# Patient Record
Sex: FEMALE | Race: OTHER | Employment: PART TIME | URBAN - METROPOLITAN AREA
[De-identification: names, ages, dates, MRNs, and addresses within clinical notes are randomized per-mention and may not be internally consistent; named-entity substitution may affect disease eponyms.]

---

## 2018-05-03 ENCOUNTER — HOSPITAL ENCOUNTER (EMERGENCY)
Facility: HOSPITAL | Age: 33
Discharge: HOME | End: 2018-05-03
Attending: EMERGENCY MEDICINE
Payer: COMMERCIAL

## 2018-05-03 ENCOUNTER — APPOINTMENT (OUTPATIENT)
Dept: GENERAL RADIOLOGY | Facility: HOSPITAL | Age: 33
End: 2018-05-03
Attending: EMERGENCY MEDICINE
Payer: COMMERCIAL

## 2018-05-03 VITALS
SYSTOLIC BLOOD PRESSURE: 118 MMHG | RESPIRATION RATE: 18 BRPM | OXYGEN SATURATION: 98 % | TEMPERATURE: 98 F | DIASTOLIC BLOOD PRESSURE: 70 MMHG | HEART RATE: 70 BPM | HEIGHT: 63 IN | BODY MASS INDEX: 31.89 KG/M2 | WEIGHT: 180 LBS

## 2018-05-03 DIAGNOSIS — M77.11 LATERAL EPICONDYLITIS, RIGHT ELBOW: Primary | ICD-10-CM

## 2018-05-03 PROCEDURE — 99283 EMERGENCY DEPT VISIT LOW MDM: CPT

## 2018-05-03 PROCEDURE — 73090 X-RAY EXAM OF FOREARM: CPT | Performed by: EMERGENCY MEDICINE

## 2018-05-03 RX ORDER — CYCLOBENZAPRINE HCL 5 MG
5 TABLET ORAL 2 TIMES DAILY PRN
Qty: 20 TABLET | Refills: 0 | Status: SHIPPED | OUTPATIENT
Start: 2018-05-03

## 2018-05-03 RX ORDER — NAPROXEN 500 MG/1
500 TABLET ORAL 2 TIMES DAILY PRN
Qty: 20 TABLET | Refills: 0 | Status: SHIPPED | OUTPATIENT
Start: 2018-05-03 | End: 2018-05-10

## 2018-05-03 RX ORDER — NAPROXEN 500 MG/1
500 TABLET ORAL ONCE
Status: COMPLETED | OUTPATIENT
Start: 2018-05-03 | End: 2018-05-03

## 2018-05-03 NOTE — ED PROVIDER NOTES
Patient Seen in: [facility] AND [facility] Emergency Department    History   Patient presents with:  Numbness Weakness (neurologic)    Stated Complaint: bilateral had numbness    HPI    Patient presents with right forearm pain and she says sometimes it feels num today and agreed except as otherwise stated in HPI.     Physical Exam   ED Triage Vitals [05/03/18 1543]  BP: 113/61  Pulse: 72  Resp: 20  Temp: 98.2 °F (36.8 °C)  Temp src: Oral  SpO2: 99 %  O2 Device: None (Room air)    Current:/70   Pulse 70   Temp pathophysiology length of time to improve NSAID therapy I offered her injection of Toradol she prefers p.o. we will give dose of naproxen as well as x-ray    X-ray was reassuring.   I discussed with her that she will need to follow-up with orthopedic physic

## 2019-04-21 ENCOUNTER — HOSPITAL ENCOUNTER (EMERGENCY)
Facility: HOSPITAL | Age: 34
Discharge: HOME | End: 2019-04-21
Attending: EMERGENCY MEDICINE
Payer: MEDICAID

## 2019-04-21 VITALS
HEART RATE: 86 BPM | RESPIRATION RATE: 14 BRPM | TEMPERATURE: 98 F | DIASTOLIC BLOOD PRESSURE: 66 MMHG | SYSTOLIC BLOOD PRESSURE: 128 MMHG | OXYGEN SATURATION: 100 %

## 2019-04-21 DIAGNOSIS — N76.4 LABIAL ABSCESS: Primary | ICD-10-CM

## 2019-04-21 PROCEDURE — 81001 URINALYSIS AUTO W/SCOPE: CPT | Performed by: EMERGENCY MEDICINE

## 2019-04-21 PROCEDURE — 87808 TRICHOMONAS ASSAY W/OPTIC: CPT | Performed by: EMERGENCY MEDICINE

## 2019-04-21 PROCEDURE — 87205 SMEAR GRAM STAIN: CPT | Performed by: EMERGENCY MEDICINE

## 2019-04-21 PROCEDURE — 96372 THER/PROPH/DIAG INJ SC/IM: CPT

## 2019-04-21 PROCEDURE — 87591 N.GONORRHOEAE DNA AMP PROB: CPT | Performed by: EMERGENCY MEDICINE

## 2019-04-21 PROCEDURE — 56405 I&D VULVA/PERINEAL ABSCESS: CPT

## 2019-04-21 PROCEDURE — 99284 EMERGENCY DEPT VISIT MOD MDM: CPT

## 2019-04-21 PROCEDURE — 87070 CULTURE OTHR SPECIMN AEROBIC: CPT | Performed by: EMERGENCY MEDICINE

## 2019-04-21 PROCEDURE — 87106 FUNGI IDENTIFICATION YEAST: CPT | Performed by: EMERGENCY MEDICINE

## 2019-04-21 PROCEDURE — 87491 CHLMYD TRACH DNA AMP PROBE: CPT | Performed by: EMERGENCY MEDICINE

## 2019-04-21 PROCEDURE — 81025 URINE PREGNANCY TEST: CPT

## 2019-04-21 RX ORDER — LIDOCAINE HYDROCHLORIDE AND EPINEPHRINE 20; 5 MG/ML; UG/ML
20 INJECTION, SOLUTION EPIDURAL; INFILTRATION; INTRACAUDAL; PERINEURAL ONCE
Status: COMPLETED | OUTPATIENT
Start: 2019-04-21 | End: 2019-04-21

## 2019-04-21 RX ORDER — KETOROLAC TROMETHAMINE 30 MG/ML
60 INJECTION, SOLUTION INTRAMUSCULAR; INTRAVENOUS ONCE
Status: COMPLETED | OUTPATIENT
Start: 2019-04-21 | End: 2019-04-21

## 2019-04-21 RX ORDER — DOXYCYCLINE HYCLATE 100 MG/1
100 CAPSULE ORAL 2 TIMES DAILY
Qty: 14 CAPSULE | Refills: 0 | Status: SHIPPED | OUTPATIENT
Start: 2019-04-21 | End: 2019-04-28

## 2019-04-21 RX ORDER — DOXYCYCLINE HYCLATE 100 MG/1
100 CAPSULE ORAL ONCE
Status: COMPLETED | OUTPATIENT
Start: 2019-04-21 | End: 2019-04-21

## 2019-04-21 RX ORDER — MELOXICAM 7.5 MG/1
7.5 TABLET ORAL DAILY
Qty: 14 TABLET | Refills: 0 | Status: SHIPPED | OUTPATIENT
Start: 2019-04-21 | End: 2019-05-05

## 2019-04-22 NOTE — ED PROVIDER NOTES
jPatient Seen in: [facility] AND [facility] Emergency Department    History   Patient presents with:  Eval-G (genital)    Stated Complaint: vagina pains     HPI    28 yo F without PMH presneting for evaluation of three days of right superior labial pain/swelling a deformity. Neurological: Alert. Skin: Skin is warm. Psychiatric: Cooperative. Nursing note and vitals reviewed.         ED Course     Labs Reviewed   URINALYSIS WITH CULTURE REFLEX - Abnormal; Notable for the following components:       Result Value 4 [facility]  Call  For followup, re-evaluation and packing removal in 48 hours.       Medications Prescribed:  Discharge Medication List as of 4/21/2019  9:00 PM    START taking these medications    Meloxicam 7.5 MG Oral Tab  Take 1 tablet (7.5 mg t

## 2020-04-02 ENCOUNTER — HOSPITAL ENCOUNTER (EMERGENCY)
Facility: HOSPITAL | Age: 35
Discharge: HOME | End: 2020-04-02
Attending: EMERGENCY MEDICINE
Payer: MEDICAID

## 2020-04-02 VITALS
WEIGHT: 150 LBS | RESPIRATION RATE: 16 BRPM | BODY MASS INDEX: 27 KG/M2 | HEART RATE: 83 BPM | TEMPERATURE: 98 F | SYSTOLIC BLOOD PRESSURE: 152 MMHG | OXYGEN SATURATION: 98 % | DIASTOLIC BLOOD PRESSURE: 81 MMHG

## 2020-04-02 DIAGNOSIS — R11.2 NON-INTRACTABLE VOMITING WITH NAUSEA, UNSPECIFIED VOMITING TYPE: ICD-10-CM

## 2020-04-02 DIAGNOSIS — G43.809 OTHER MIGRAINE WITHOUT STATUS MIGRAINOSUS, NOT INTRACTABLE: Primary | ICD-10-CM

## 2020-04-02 PROCEDURE — 96361 HYDRATE IV INFUSION ADD-ON: CPT

## 2020-04-02 PROCEDURE — 96374 THER/PROPH/DIAG INJ IV PUSH: CPT

## 2020-04-02 PROCEDURE — 99284 EMERGENCY DEPT VISIT MOD MDM: CPT

## 2020-04-02 PROCEDURE — 81025 URINE PREGNANCY TEST: CPT

## 2020-04-02 PROCEDURE — 85025 COMPLETE CBC W/AUTO DIFF WBC: CPT | Performed by: EMERGENCY MEDICINE

## 2020-04-02 PROCEDURE — 96375 TX/PRO/DX INJ NEW DRUG ADDON: CPT

## 2020-04-02 PROCEDURE — 80048 BASIC METABOLIC PNL TOTAL CA: CPT | Performed by: EMERGENCY MEDICINE

## 2020-04-02 RX ORDER — METOCLOPRAMIDE HYDROCHLORIDE 5 MG/ML
10 INJECTION INTRAMUSCULAR; INTRAVENOUS ONCE
Status: COMPLETED | OUTPATIENT
Start: 2020-04-02 | End: 2020-04-02

## 2020-04-02 RX ORDER — DIPHENHYDRAMINE HYDROCHLORIDE 50 MG/ML
25 INJECTION INTRAMUSCULAR; INTRAVENOUS ONCE
Status: COMPLETED | OUTPATIENT
Start: 2020-04-02 | End: 2020-04-02

## 2020-04-02 RX ORDER — KETOROLAC TROMETHAMINE 30 MG/ML
30 INJECTION, SOLUTION INTRAMUSCULAR; INTRAVENOUS ONCE
Status: COMPLETED | OUTPATIENT
Start: 2020-04-02 | End: 2020-04-02

## 2020-04-02 RX ORDER — ONDANSETRON 4 MG/1
4 TABLET, ORALLY DISINTEGRATING ORAL EVERY 4 HOURS PRN
Qty: 10 TABLET | Refills: 0 | Status: SHIPPED | OUTPATIENT
Start: 2020-04-02 | End: 2020-04-09

## 2020-04-02 NOTE — ED INITIAL ASSESSMENT (HPI)
C/o vomiting since yesterday, \"all night last night\". Denies diarrhea, abdominal pain or known fever.  +migraine

## 2020-04-02 NOTE — ED PROVIDER NOTES
Patient Seen in: [facility] AND [facility] Emergency Department      History   Patient presents with:  Nausea/Vomiting/Diarrhea    Stated Complaint: N/V/D    HPI    14-year-old female with no significant past medical history here with complaints of nausea, vomit Resp 20   Temp 98.3 °F (36.8 °C)   Temp src Oral   SpO2 100 %   O2 Device None (Room air)       Current:/81   Pulse 83   Temp 98.3 °F (36.8 °C) (Oral)   Resp 16   Wt 68 kg   LMP 04/02/2020   SpO2 98%   BMI 26.57 kg/m²         Physical Exam  Vitals Potassium 3.6 3.5 - 5.1 mmol/L    Chloride 109 98 - 112 mmol/L    CO2 24.0 21.0 - 32.0 mmol/L    Anion Gap 5 0 - 18 mmol/L    BUN 8 7 - 18 mg/dL    Creatinine 0.74 0.55 - 1.02 mg/dL    BUN/CREA Ratio 10.8 10.0 - 20.0    Calcium, Total 8.9 8.5 - 10.1 mg/dL testing, and procedures, and reviewed those reports. Complicating Factors: The patient already has does not have a problem list on file. to contribute to the complexity of his ED evaluation.     - pt  comfortable with d/c at this time, will d/c pt home n

## 2020-04-07 ENCOUNTER — HOSPITAL ENCOUNTER (EMERGENCY)
Facility: HOSPITAL | Age: 35
Discharge: HOME | End: 2020-04-07
Attending: EMERGENCY MEDICINE
Payer: MEDICAID

## 2020-04-07 VITALS
SYSTOLIC BLOOD PRESSURE: 126 MMHG | BODY MASS INDEX: 24.98 KG/M2 | HEIGHT: 65 IN | WEIGHT: 149.94 LBS | DIASTOLIC BLOOD PRESSURE: 70 MMHG | HEART RATE: 70 BPM | TEMPERATURE: 99 F | OXYGEN SATURATION: 98 % | RESPIRATION RATE: 20 BRPM

## 2020-04-07 DIAGNOSIS — Z20.822 SUSPECTED 2019 NOVEL CORONAVIRUS INFECTION: Primary | ICD-10-CM

## 2020-04-07 PROCEDURE — 99284 EMERGENCY DEPT VISIT MOD MDM: CPT

## 2020-04-07 PROCEDURE — 96361 HYDRATE IV INFUSION ADD-ON: CPT

## 2020-04-07 PROCEDURE — 96375 TX/PRO/DX INJ NEW DRUG ADDON: CPT

## 2020-04-07 PROCEDURE — 96374 THER/PROPH/DIAG INJ IV PUSH: CPT

## 2020-04-07 PROCEDURE — 85060 BLOOD SMEAR INTERPRETATION: CPT | Performed by: EMERGENCY MEDICINE

## 2020-04-07 PROCEDURE — 80048 BASIC METABOLIC PNL TOTAL CA: CPT | Performed by: EMERGENCY MEDICINE

## 2020-04-07 PROCEDURE — 85025 COMPLETE CBC W/AUTO DIFF WBC: CPT | Performed by: EMERGENCY MEDICINE

## 2020-04-07 RX ORDER — ONDANSETRON 2 MG/ML
4 INJECTION INTRAMUSCULAR; INTRAVENOUS ONCE
Status: COMPLETED | OUTPATIENT
Start: 2020-04-07 | End: 2020-04-07

## 2020-04-07 RX ORDER — ACETAMINOPHEN 500 MG
1000 TABLET ORAL ONCE
Status: COMPLETED | OUTPATIENT
Start: 2020-04-07 | End: 2020-04-07

## 2020-04-07 RX ORDER — LORAZEPAM 2 MG/ML
0.5 INJECTION INTRAMUSCULAR ONCE
Status: COMPLETED | OUTPATIENT
Start: 2020-04-07 | End: 2020-04-07

## 2020-04-07 RX ORDER — ACETAMINOPHEN 500 MG
500 TABLET ORAL EVERY 4 HOURS PRN
Qty: 20 TABLET | Refills: 0 | Status: SHIPPED | OUTPATIENT
Start: 2020-04-07

## 2020-04-07 NOTE — ED PROVIDER NOTES
Patient Seen in: [facility] AND [facility] Emergency Department      History   Patient presents with:  Vomiting  Weakness    Stated Complaint: weak, vomiting, fever    HPI    27-year-old female with no significant past medical history here with complaints of gen [04/07/20 0938]   /70   Pulse 78   Resp 20   Temp 98.8 °F (37.1 °C)   Temp src Oral   SpO2 98 %   O2 Device None (Room air)       Current:/70   Pulse 78   Temp 98.8 °F (37.1 °C) (Oral)   Resp 20   Ht 165.1 cm (5' 5\")   Wt 68 kg   LMP 04/01/202 mmol/L    CO2 22.0 21.0 - 32.0 mmol/L    Anion Gap 7 0 - 18 mmol/L    BUN 8 7 - 18 mg/dL    Creatinine 0.68 0.55 - 1.02 mg/dL    BUN/CREA Ratio 11.8 10.0 - 20.0    Calcium, Total 8.6 8.5 - 10.1 mg/dL    Calculated Osmolality 290 275 - 295 mOsm/kg    GFR, N neutropenia may include idiosyncratic drug reactions, infections, space occupying lesions of the bone marrow, myeloablative therapies, autoimmune disorders, aplastic anemia, hypersplenism, megaloblastic anemia, and large granular lymphocytosis.      Lymphoc such as Chantix and others to assist with smoking cessation. Medical Record Review: I personally reviewed available prior medical records for any recent pertinent discharge summaries, testing, and procedures, and reviewed those reports.     Compltalitat

## 2022-03-02 ENCOUNTER — APPOINTMENT (OUTPATIENT)
Dept: GENERAL RADIOLOGY | Facility: HOSPITAL | Age: 37
End: 2022-03-02
Attending: NURSE PRACTITIONER
Payer: MEDICAID

## 2022-03-02 ENCOUNTER — HOSPITAL ENCOUNTER (EMERGENCY)
Facility: HOSPITAL | Age: 37
Discharge: HOME | End: 2022-03-02
Payer: MEDICAID

## 2022-03-02 VITALS
DIASTOLIC BLOOD PRESSURE: 76 MMHG | BODY MASS INDEX: 35.44 KG/M2 | WEIGHT: 200 LBS | HEART RATE: 97 BPM | SYSTOLIC BLOOD PRESSURE: 114 MMHG | TEMPERATURE: 98 F | RESPIRATION RATE: 17 BRPM | OXYGEN SATURATION: 100 % | HEIGHT: 63 IN

## 2022-03-02 DIAGNOSIS — M54.32 SCIATICA OF LEFT SIDE: Primary | ICD-10-CM

## 2022-03-02 LAB — B-HCG UR QL: NEGATIVE

## 2022-03-02 PROCEDURE — 72100 X-RAY EXAM L-S SPINE 2/3 VWS: CPT | Performed by: NURSE PRACTITIONER

## 2022-03-02 PROCEDURE — 99283 EMERGENCY DEPT VISIT LOW MDM: CPT

## 2022-03-02 PROCEDURE — 81025 URINE PREGNANCY TEST: CPT

## 2022-03-02 RX ORDER — METHOCARBAMOL 750 MG/1
750 TABLET, FILM COATED ORAL 4 TIMES DAILY
Qty: 40 TABLET | Refills: 0 | Status: SHIPPED | OUTPATIENT
Start: 2022-03-02 | End: 2022-03-12

## 2022-03-02 RX ORDER — METHYLPREDNISOLONE 4 MG/1
TABLET ORAL
Qty: 1 EACH | Refills: 0 | Status: SHIPPED | OUTPATIENT
Start: 2022-03-02

## 2022-03-03 NOTE — ED INITIAL ASSESSMENT (HPI)
Patient complaining of lower back pain radiating down her left leg x3 months. Patient seen by PMD but medications are not helping her. Patient ambulatory in triage.

## 2022-07-11 ENCOUNTER — HOSPITAL ENCOUNTER (EMERGENCY)
Facility: HOSPITAL | Age: 37
Discharge: HOME | End: 2022-07-11
Attending: EMERGENCY MEDICINE
Payer: MEDICAID

## 2022-07-11 ENCOUNTER — APPOINTMENT (OUTPATIENT)
Dept: ULTRASOUND IMAGING | Facility: HOSPITAL | Age: 37
End: 2022-07-11
Payer: MEDICAID

## 2022-07-11 VITALS
DIASTOLIC BLOOD PRESSURE: 75 MMHG | SYSTOLIC BLOOD PRESSURE: 137 MMHG | OXYGEN SATURATION: 100 % | HEART RATE: 60 BPM | TEMPERATURE: 98 F | WEIGHT: 220.44 LBS | BODY MASS INDEX: 39 KG/M2 | RESPIRATION RATE: 16 BRPM

## 2022-07-11 DIAGNOSIS — M79.671 ACUTE PAIN OF RIGHT FOOT: Primary | ICD-10-CM

## 2022-07-11 PROCEDURE — 99284 EMERGENCY DEPT VISIT MOD MDM: CPT

## 2022-07-11 PROCEDURE — 93971 EXTREMITY STUDY: CPT | Performed by: EMERGENCY MEDICINE

## 2022-07-11 NOTE — ED INITIAL ASSESSMENT (HPI)
Patient presents with:  Leg Pain: Aox4. Ambulatory with slow limping gait. Complaints of atraumatic right foot pain that extend up to popliteal. Hx DVT 24 yo with pregnancy.  Denies long car rides or travel. +swelling, +warmth  Swelling Edema

## 2022-08-22 ENCOUNTER — NURSE ONLY (OUTPATIENT)
Dept: INTERNAL MEDICINE CLINIC | Facility: HOSPITAL | Age: 37
End: 2022-08-22
Attending: EMERGENCY MEDICINE

## 2022-08-22 DIAGNOSIS — Z00.00 WELLNESS EXAMINATION: Primary | ICD-10-CM

## 2022-08-22 LAB
HBV SURFACE AB SER QL: REACTIVE
HBV SURFACE AB SERPL IA-ACNC: 26.69 MIU/ML

## 2022-08-22 PROCEDURE — 86480 TB TEST CELL IMMUN MEASURE: CPT

## 2022-08-22 PROCEDURE — 86706 HEP B SURFACE ANTIBODY: CPT

## 2022-08-22 PROCEDURE — 86765 RUBEOLA ANTIBODY: CPT

## 2022-08-22 PROCEDURE — 86735 MUMPS ANTIBODY: CPT

## 2022-08-23 LAB
M TB IFN-G CD4+ T-CELLS BLD-ACNC: 0.16 IU/ML
M TB TUBERC IFN-G BLD QL: NEGATIVE
M TB TUBERC IGNF/MITOGEN IGNF CONTROL: >10 IU/ML
QFT TB1 AG MINUS NIL: 0.04 IU/ML
QFT TB2 AG MINUS NIL: -0.02 IU/ML

## 2022-08-24 LAB
MEV IGG SER-ACNC: 32.4 AU/ML (ref 16.5–?)
MUV IGG SER IA-ACNC: 278 AU/ML (ref 11–?)

## 2022-12-05 ENCOUNTER — HOSPITAL ENCOUNTER (EMERGENCY)
Facility: HOSPITAL | Age: 37
Discharge: HOME | End: 2022-12-05
Attending: EMERGENCY MEDICINE
Payer: MEDICAID

## 2022-12-05 VITALS
OXYGEN SATURATION: 97 % | DIASTOLIC BLOOD PRESSURE: 83 MMHG | BODY MASS INDEX: 35.44 KG/M2 | HEART RATE: 79 BPM | TEMPERATURE: 98 F | RESPIRATION RATE: 18 BRPM | SYSTOLIC BLOOD PRESSURE: 134 MMHG | HEIGHT: 63 IN | WEIGHT: 200 LBS

## 2022-12-05 DIAGNOSIS — G43.809 OTHER MIGRAINE WITHOUT STATUS MIGRAINOSUS, NOT INTRACTABLE: Primary | ICD-10-CM

## 2022-12-05 PROCEDURE — 99284 EMERGENCY DEPT VISIT MOD MDM: CPT

## 2022-12-05 PROCEDURE — 96375 TX/PRO/DX INJ NEW DRUG ADDON: CPT

## 2022-12-05 PROCEDURE — 96374 THER/PROPH/DIAG INJ IV PUSH: CPT

## 2022-12-05 PROCEDURE — 96361 HYDRATE IV INFUSION ADD-ON: CPT

## 2022-12-05 RX ORDER — DEXAMETHASONE SODIUM PHOSPHATE 4 MG/ML
6 VIAL (ML) INJECTION ONCE
Status: COMPLETED | OUTPATIENT
Start: 2022-12-05 | End: 2022-12-05

## 2022-12-05 RX ORDER — ONDANSETRON 2 MG/ML
4 INJECTION INTRAMUSCULAR; INTRAVENOUS ONCE
Status: COMPLETED | OUTPATIENT
Start: 2022-12-05 | End: 2022-12-05

## 2022-12-05 RX ORDER — PREDNISONE 20 MG/1
40 TABLET ORAL DAILY
Qty: 6 TABLET | Refills: 0 | Status: SHIPPED | OUTPATIENT
Start: 2022-12-05 | End: 2022-12-08

## 2022-12-05 RX ORDER — ONDANSETRON 4 MG/1
4 TABLET, ORALLY DISINTEGRATING ORAL EVERY 4 HOURS PRN
Qty: 12 TABLET | Refills: 0 | Status: SHIPPED | OUTPATIENT
Start: 2022-12-05 | End: 2022-12-12

## 2022-12-05 RX ORDER — KETOROLAC TROMETHAMINE 15 MG/ML
15 INJECTION, SOLUTION INTRAMUSCULAR; INTRAVENOUS ONCE
Status: COMPLETED | OUTPATIENT
Start: 2022-12-05 | End: 2022-12-05

## 2022-12-05 RX ORDER — KETOROLAC TROMETHAMINE 10 MG/1
10 TABLET, FILM COATED ORAL EVERY 6 HOURS PRN
Qty: 15 TABLET | Refills: 0 | Status: SHIPPED | OUTPATIENT
Start: 2022-12-05 | End: 2022-12-12

## 2022-12-05 NOTE — ED QUICK NOTES
Patient awake, alert, skin WNL. Discharge paperwork, prescriptions and follow-up discussed with pt, pt verbally understands them. Pt discharged ambulatory with steady gait - no distress noted.

## 2024-08-08 ENCOUNTER — HOSPITAL ENCOUNTER (OUTPATIENT)
Age: 39
Discharge: HOME | End: 2024-08-08
Payer: MEDICAID

## 2024-08-08 VITALS
OXYGEN SATURATION: 98 % | HEART RATE: 77 BPM | SYSTOLIC BLOOD PRESSURE: 132 MMHG | DIASTOLIC BLOOD PRESSURE: 78 MMHG | RESPIRATION RATE: 18 BRPM | TEMPERATURE: 97 F

## 2024-08-08 DIAGNOSIS — Z20.822 ENCOUNTER FOR LABORATORY TESTING FOR COVID-19 VIRUS: ICD-10-CM

## 2024-08-08 DIAGNOSIS — U07.1 COVID-19: Primary | ICD-10-CM

## 2024-08-08 LAB — SARS-COV-2 RNA RESP QL NAA+PROBE: DETECTED

## 2024-08-08 PROCEDURE — U0002 COVID-19 LAB TEST NON-CDC: HCPCS | Performed by: NURSE PRACTITIONER

## 2024-08-08 PROCEDURE — 99213 OFFICE O/P EST LOW 20 MIN: CPT | Performed by: NURSE PRACTITIONER

## 2024-08-08 RX ORDER — BENZONATATE 100 MG/1
100 CAPSULE ORAL 3 TIMES DAILY PRN
Qty: 20 CAPSULE | Refills: 0 | Status: SHIPPED | OUTPATIENT
Start: 2024-08-08 | End: 2024-08-15

## 2024-08-08 NOTE — ED PROVIDER NOTES
Patient Seen in: [facility]    History   CC: cough  HPI: Odette Isbell 39 year old female  who presents c/o cough, congestion, fever, vomiting x2 episodes a few days ago, generalized fatigue and myalgias beginning 8/5/2024. Denies haylie, cp, hemoptysis, abd pain, diarrhea.     Past Medical History:    DVT (deep vein thrombosis) in pregnancy (HCC)       Past Surgical History:   Procedure Laterality Date    Appendectomy         No family history on file.    Social History     Socioeconomic History    Marital status:    Tobacco Use    Smoking status: Some Days     Passive exposure: Never    Smokeless tobacco: Never   Vaping Use    Vaping status: Never Used   Substance and Sexual Activity    Alcohol use: Never    Drug use: Never       ROS:  Review of Systems    Positive for stated complaint: Headache,Earring Trouble,Back pain  Other systems are as noted in HPI.  Constitutional and vital signs reviewed.      All other systems reviewed and negative except as noted above.    PSFH elements reviewed from today and agreed except as otherwise stated in HPI.             Constitutional and vital signs reviewed.        Physical Exam     ED Triage Vitals [08/08/24 0922]   /78   Pulse 77   Resp 18   Temp 97 °F (36.1 °C)   Temp src Temporal   SpO2 98 %   O2 Device None (Room air)       Current:/78   Pulse 77   Temp 97 °F (36.1 °C) (Temporal)   Resp 18   LMP 08/04/2024 (Exact Date)   SpO2 98%         PE:  General - Appears well, non-toxic and in NAD  Head - Appears symmetrical without deformity/swelling cranium, scalp, or facial bones  Eyes - sclera not injected, no discharge noted, no periorbital edema  ENT - EAC bilaterally without discharge, TM pearly grey with COL visualized appropriately bilaterally.   no nasal drainage noted in nares bilat, no cobblestoning to post. Pharynx.   Oropharynx clear, posterior pharynx is without erythema and without tonsilar enlargement or exudate, uvula  midline, +gag, voice is clear. No trismus  Neck - no significant adenopathy, supple with trachea midline  Resp - Lung sounds clear bilaterally and wob unlabored, good aeration with equal, even expansion bilaterally   CV - RRR  GI - Appears round and flat, BS +x4 quadrants, no tenderness/guarding with palpation  Skin - no rashes or petechiae noted, pink warm and dry throughout, mmm, cap refill <2seconds  Neuro - A&O x4, steady gait  MSK - makes purposeful movements of all extremities, radial pulses 2+ bilat.  Psych - Interactive and appropriate      ED Course     Labs Reviewed   RAPID SARS-COV-2 BY PCR - Abnormal; Notable for the following components:       Result Value    Rapid SARS-CoV-2 by PCR Detected (*)     All other components within normal limits       MDM     DDx: COVID-19, unspecified viral illness, pneumonia    Rapid covid PCR positive. Generalized Covid19 and viral illness counseling performed. Reviewed over-the-counter nonpharmacologic treatment modalities such as rest, hydration instructions, Tylenol or Motrin as needed for discomfort, prescriptions as written and indications/precautions on use reviewed, exposure window, s/s, quarantine, f/u and ED/return precautions.  Patient does not take any home medications and last GFR greater than 60 performed 4/9/2024.  Pt demonstrates understanding of information and agrees w/ poc.  Patient is historian and demonstrates understanding of all instruction and agrees with plan of care.      Disposition and Plan     Clinical Impression:  1. COVID-19    2. Encounter for laboratory testing for COVID-19 virus        Disposition:  Discharge    Follow-up:  Dahlia Chand MD  [facility] [address] [place] [address] [phone]    Go in 1 week  As needed      Medications Prescribed:  Current Discharge Medication List        START taking these medications    Details   nirmatrelvir-ritonavir 300-100 MG Oral Tablet Therapy Pack Take two nirmatrelvir tablets (300mg)  with one ritonavir tablet (100mg) together twice daily for 5 days.  Qty: 30 tablet, Refills: 0      benzonatate 100 MG Oral Cap Take 1 capsule (100 mg total) by mouth 3 (three) times daily as needed for cough.  Qty: 20 capsule, Refills: 0                        No

## 2024-08-08 NOTE — ED INITIAL ASSESSMENT (HPI)
Pt presents with congestion, fever and low back pain x 4 days. Pt reports, \"I feel like I have Covid\".

## 2024-11-04 ENCOUNTER — HOSPITAL ENCOUNTER (OUTPATIENT)
Age: 39
Discharge: HOME | End: 2024-11-04
Payer: MEDICAID

## 2024-11-04 VITALS
RESPIRATION RATE: 18 BRPM | OXYGEN SATURATION: 100 % | DIASTOLIC BLOOD PRESSURE: 73 MMHG | TEMPERATURE: 98 F | SYSTOLIC BLOOD PRESSURE: 129 MMHG | HEART RATE: 70 BPM

## 2024-11-04 DIAGNOSIS — G43.809 OTHER MIGRAINE WITHOUT STATUS MIGRAINOSUS, NOT INTRACTABLE: Primary | ICD-10-CM

## 2024-11-04 PROCEDURE — 96375 TX/PRO/DX INJ NEW DRUG ADDON: CPT | Performed by: NURSE PRACTITIONER

## 2024-11-04 PROCEDURE — 96374 THER/PROPH/DIAG INJ IV PUSH: CPT | Performed by: NURSE PRACTITIONER

## 2024-11-04 PROCEDURE — 99214 OFFICE O/P EST MOD 30 MIN: CPT | Performed by: NURSE PRACTITIONER

## 2024-11-04 RX ORDER — METOCLOPRAMIDE HYDROCHLORIDE 5 MG/ML
10 INJECTION INTRAMUSCULAR; INTRAVENOUS ONCE
Status: COMPLETED | OUTPATIENT
Start: 2024-11-04 | End: 2024-11-04

## 2024-11-04 RX ORDER — DIPHENHYDRAMINE HYDROCHLORIDE 50 MG/ML
25 INJECTION INTRAMUSCULAR; INTRAVENOUS ONCE
Status: COMPLETED | OUTPATIENT
Start: 2024-11-04 | End: 2024-11-04

## 2024-11-04 RX ORDER — KETOROLAC TROMETHAMINE 30 MG/ML
15 INJECTION, SOLUTION INTRAMUSCULAR; INTRAVENOUS ONCE
Status: COMPLETED | OUTPATIENT
Start: 2024-11-04 | End: 2024-11-04

## 2024-11-04 RX ORDER — PREDNISONE 20 MG/1
40 TABLET ORAL ONCE
Status: COMPLETED | OUTPATIENT
Start: 2024-11-04 | End: 2024-11-04

## 2024-11-04 NOTE — ED PROVIDER NOTES
Patient Seen in: [facility]    History   CC: headache  HPI: Odette Isbell 39 year old female  who presents c/o migraine headache which feels like her typical migraine on the left sided of her head upon waking today. Denies dizziness, weakness, vision changes, n/v. Denies URI s/s, fever, rash. States she does also have some paraesthesias to her left hand fingers and left leg which she has had before and attributes to sciatica. Denies cp, haylie, gi s/s, recent injury/trauma, saddle anesthesia, loss of bowel or bladder control.  Denies urinary changes/complaints. Ran out of her migraine medicine. Denies taking medication for pain pta.     Past Medical History:    DVT (deep vein thrombosis) in pregnancy (HCC)    Migraines       Past Surgical History:   Procedure Laterality Date    Appendectomy         No family history on file.    Social History     Socioeconomic History    Marital status:    Tobacco Use    Smoking status: Some Days     Passive exposure: Never    Smokeless tobacco: Never   Vaping Use    Vaping status: Never Used   Substance and Sexual Activity    Alcohol use: Never    Drug use: Never       ROS:  Systems reviewed: All pertinent positives noted in HPI. Unless otherwise noted, additional systems reviewed are negative.   Vital signs reviewed.    Positive for stated complaint: left side body numbess, severe headache  Other systems are as noted in HPI.  Constitutional and vital signs reviewed.      All other systems reviewed and negative except as noted above.    PSFH elements reviewed from today and agreed except as otherwise stated in HPI.             Constitutional and vital signs reviewed.        Physical Exam     ED Triage Vitals [11/04/24 1427]   /73   Pulse 70   Resp 18   Temp 98.3 °F (36.8 °C)   Temp src Temporal   SpO2 100 %   O2 Device None (Room air)       Current:/73   Pulse 70   Temp 98.3 °F (36.8 °C) (Temporal)   Resp 18   LMP 10/30/2024 (Exact Date)    SpO2 100%         PE:  General - Appears well, non-toxic and in NAD  Head - Appears symmetrical without deformity/swelling cranium, scalp, or facial bones, no tenderness on palpation throughout, TMJ bilat without crepitus or limited ROM  Eyes - PERRLA, sclera not injected, no discharge noted, no periorbital edema, no pain/difficulty with EOM  ENT - EAC bilaterally without discharge.   Oropharynx clear, voice is clear. No trismus  Neck - supple with trachea midline. No midline tenderness on exam  Resp - Lung sounds clear bilaterally and wob unlabored, good aeration with equal, even expansion bilaterally   CV - RRR  Skin - no rashes or petechiae noted, pink warm and dry throughout, mmm, cap refill <2seconds  Neuro - A&O x4, sensation equal to both medial and lateral aspects of extremities, steady gait  MSK - makes purposeful movements of all extremities with full ROM noted, hand grasp and foot press strength equal bilat, radial and pedal pulses 2+ bilat.  no midline spinal tenderness or obvious sign of trauma/swelling/deformity, +flexion of the 1st and 5th toes bilat.  Psych - Interactive and appropriate      ED Course   Labs Reviewed - No data to display    MDM     DDx: Migraine, radiculopathy, CVA    Neuroexam normal.  Vitals stable.  States this feels similar to previous migraines.  Migraine cocktail given in the immediate care with complete resolution of patient's symptoms.  She is requesting to go home at this time.  Headache journal reviewed as well as strict ED precautions and follow-up instructions reviewed. Patient is historian and demonstrates understanding of all instruction and agrees with plan of care.      Disposition and Plan     Clinical Impression:  1. Other migraine without status migrainosus, not intractable        Disposition:  Discharge    Follow-up:  Alex Gant MD  [address] [address]  [phone]    Go in 1 week  As needed      Medications Prescribed:  Discharge  Medication List as of 11/4/2024  3:19 PM

## 2024-11-04 NOTE — DISCHARGE INSTRUCTIONS
You can take Motrin and/or Tylenol as needed for pain control. Follow up with your primary care provider in the next 1-2 days. In the meantime, keep a headache journal. Write down when you get a headache and record things like headache characteristics, caffeine intake, sleep habits, stress, and diet. Bring the journal with you to your follow up appointment with your primary doctor. Seek additional care in the ER for new or worsening symptoms, severe headaches in which pain is not controlled with Motrin and Tylenol, changes in vision, vomiting, speech changes, or body or extremity weakness.

## 2025-03-17 ENCOUNTER — HOSPITAL ENCOUNTER (OUTPATIENT)
Age: 40
Discharge: HOME | End: 2025-03-17
Payer: MEDICAID

## 2025-03-17 ENCOUNTER — APPOINTMENT (OUTPATIENT)
Dept: GENERAL RADIOLOGY | Age: 40
End: 2025-03-17
Attending: PHYSICIAN ASSISTANT
Payer: MEDICAID

## 2025-03-17 VITALS
SYSTOLIC BLOOD PRESSURE: 129 MMHG | RESPIRATION RATE: 18 BRPM | OXYGEN SATURATION: 100 % | HEART RATE: 76 BPM | DIASTOLIC BLOOD PRESSURE: 71 MMHG | TEMPERATURE: 98 F

## 2025-03-17 DIAGNOSIS — M25.522 LEFT ELBOW PAIN: ICD-10-CM

## 2025-03-17 DIAGNOSIS — M25.521 RIGHT ELBOW PAIN: Primary | ICD-10-CM

## 2025-03-17 DIAGNOSIS — R53.83 FATIGUE: ICD-10-CM

## 2025-03-17 LAB
POCT INFLUENZA A: NEGATIVE
POCT INFLUENZA B: NEGATIVE

## 2025-03-17 PROCEDURE — 73080 X-RAY EXAM OF ELBOW: CPT | Performed by: PHYSICIAN ASSISTANT

## 2025-03-17 PROCEDURE — 99213 OFFICE O/P EST LOW 20 MIN: CPT | Performed by: PHYSICIAN ASSISTANT

## 2025-03-17 PROCEDURE — 87502 INFLUENZA DNA AMP PROBE: CPT | Performed by: PHYSICIAN ASSISTANT

## 2025-03-17 RX ORDER — IBUPROFEN 600 MG/1
TABLET, FILM COATED ORAL
Qty: 20 TABLET | Refills: 0 | Status: SHIPPED | OUTPATIENT
Start: 2025-03-17

## 2025-03-17 NOTE — ED PROVIDER NOTES
Patient Seen in: [facility]    History     Chief Complaint   Patient presents with    Body ache and/or chills     Stated Complaint: body aches    HPI    70-year-old female presents with chief complaint of generalized bodyaches.  Onset 2 days ago.  Patient also reports bilateral elbow pain that has been present for the past month.  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  Patient denies fever, chills, cough, earache, nasal drainage, sore throat, abdominal pain, nausea, vomiting, diarrhea, constipation, dysuria, hematuria, flank pain, rash, neck pain, neck swelling, restricted neck movement, dyspnea, wheeze, hemoptysis, head/neck injury or pain, open wound, decreased range of motion, swelling, ecchymosis, erythema, weakness, paresthesias.      Past Medical History:    DVT (deep vein thrombosis) in pregnancy (HCC)    Migraines       Past Surgical History:   Procedure Laterality Date    Appendectomy              No family history on file.    Social History     Socioeconomic History    Marital status:    Tobacco Use    Smoking status: Some Days     Passive exposure: Never    Smokeless tobacco: Never   Vaping Use    Vaping status: Never Used   Substance and Sexual Activity    Alcohol use: Never    Drug use: Never     Social Drivers of Health     Food Insecurity: Not at Risk (2024)    Received from [email or web address]    Food Insecurity     Food: 1   Transportation Needs: Not at Risk (2025)    Received from [email or web address]    Transportation Needs     Transportation: 1   Housing Stability: Not at Risk (2025)    Received from [email or web address]    Housing Stability     Housin       Review of Systems    Positive for stated complaint: body aches  Other systems are as noted in HPI.  Constitutional and vital signs reviewed.      All other systems reviewed and negative except as noted above.    PSFH elements reviewed from today and  agreed except as otherwise stated in HPI.    Physical Exam     ED Triage Vitals [03/17/25 1308]   /71   Pulse 76   Resp 18   Temp 98 °F (36.7 °C)   Temp src Oral   SpO2 100 %   O2 Device None (Room air)       Current:/71   Pulse 76   Temp 98 °F (36.7 °C) (Oral)   Resp 18   LMP 10/30/2024 (Exact Date)   SpO2 100%      PULSE OX within normal limits on room air as interpreted by this provider.    Constitutional: The patient is cooperative. Appears well-developed and well-nourished.  Mild discomfort.  Psychological: Alert, No abnormalities of mood, affect.  Head: Normocephalic/atraumatic.  Eyes: Pupils are equal round reactive to light.  Conjunctiva are within normal limits.  ENT: Pharynx noninjected.  Tonsils within normal size limits bilaterally.  No tonsillar exudates.  Mucous membranes moist.  Neck: The neck is supple.  No meningeal signs.   Respiratory: Respiratory effort was normal.  There is no stridor.  Air entry is equal.  Cardiovascular: Regular rate and rhythm.  Capillary refill is brisk.    Genitourinary: Not examined.  Lymphatic: No gross lymphadenopathy noted.  Musculoskeletal: Bilateral upper extremities-Bilateral upper extremities normal to inspection without acute bony deformity.  Positive tenderness to palpation present at bilateral posterior elbows.  Full range of motion of bilateral elbows.  Remainder of bilateral upper extremities nontender to palpation.  Distal pulses intact.  Capillary refill normal.  Motor intact distally.  Sensory intact distally.  No ecchymosis.  No erythema.  No open wounds.  No compartment syndrome.  No edema.  Remainder of musculoskeletal system is grossly intact.  There is no obvious deformity.  Neurological: Gross motor movement is intact in all 4 extremities.  Patient exhibits normal speech.  Skin: Skin is normal to inspection.  Warm and dry.  No obvious bruising.  No obvious rash.    ED Course     Labs Reviewed   POCT FLU TEST - Normal    Narrative:      This assay is a rapid molecular in vitro test utilizing nucleic acid amplification of influenza A and B viral RNA.       MDM     Differential diagnosis including but not limited to fracture, strain/strain, contusion, tendinitis, arthritis, influenza    Influenza negative.    Radiology findings: XR ELBOW, COMPLETE (MIN 3 VIEWS), RIGHT (CPT=73080)    Result Date: 3/17/2025  CONCLUSION:  No radiographically visible acute osseous abnormality of the right elbow.    Dictated by (CST): Vaughn Carpenter MD on 3/17/2025 at 2:00 PM     Finalized by (CST): Vaughn Carpenter MD on 3/17/2025 at 2:01 PM          XR ELBOW, COMPLETE (MIN 3 VIEWS), LEFT (CPT=73080)    Result Date: 3/17/2025  CONCLUSION:  No radiographically visible acute osseous abnormality of the left elbow.    Dictated by (CST): Vaughn Carpenter MD on 3/17/2025 at 1:58 PM     Finalized by (CST): Vaughn Carpenter MD on 3/17/2025 at 1:59 PM           X-ray images of right elbow independently viewed by this provider-no acute fracture.  X-ray images of left elbow independently viewed by this provider-no acute fracture.    Physical exam remained stable as previously documented.  Available results reviewed with patient.    I have given the patient instructions regarding their diagnoses, expectations, follow up, and ER precautions. I explained to the patient that emergent conditions may arise and to go to the ER for new, worsening or any persistent conditions. I've explained the importance of following up with their doctor as instructed. The patient verbalized understanding of the discharge instructions and plan.    Disposition and Plan     Clinical Impression:  1. Right elbow pain    2. Fatigue    3. Left elbow pain        Disposition:  Discharge    Follow-up:  Amber Li, BHARTI  [address] [facility] [address]  [phone]    Call in 1 day  For follow-up    Efraín Urias MD  [address] [facility] [facility]  [address]  [facility] [id number] [phone]    Call in 1  day  For follow-up      Medications Prescribed:  Current Discharge Medication List        START taking these medications    Details   ibuprofen 600 MG Oral Tab Take 1 tablet (600 mg total) by mouth every 6 hours with food  Qty: 20 tablet, Refills: 0

## 2025-05-07 ENCOUNTER — HOSPITAL ENCOUNTER (OUTPATIENT)
Age: 40
Discharge: HOME | End: 2025-05-07
Payer: MEDICAID

## 2025-05-07 VITALS
HEART RATE: 76 BPM | OXYGEN SATURATION: 97 % | SYSTOLIC BLOOD PRESSURE: 128 MMHG | DIASTOLIC BLOOD PRESSURE: 73 MMHG | TEMPERATURE: 98 F | RESPIRATION RATE: 20 BRPM

## 2025-05-07 DIAGNOSIS — R31.9 URINARY TRACT INFECTION WITH HEMATURIA, SITE UNSPECIFIED: Primary | ICD-10-CM

## 2025-05-07 DIAGNOSIS — N39.0 URINARY TRACT INFECTION WITH HEMATURIA, SITE UNSPECIFIED: Primary | ICD-10-CM

## 2025-05-07 DIAGNOSIS — J02.9 ACUTE PHARYNGITIS, UNSPECIFIED ETIOLOGY: ICD-10-CM

## 2025-05-07 LAB
B-HCG UR QL: NEGATIVE
BILIRUB UR QL STRIP: NEGATIVE
CLARITY UR: CLEAR
COLOR UR: YELLOW
GLUCOSE UR STRIP-MCNC: NEGATIVE MG/DL
KETONES UR STRIP-MCNC: NEGATIVE MG/DL
NITRITE UR QL STRIP: NEGATIVE
PH UR STRIP: 6 [PH]
PROT UR STRIP-MCNC: NEGATIVE MG/DL
S PYO AG THROAT QL: NEGATIVE
SP GR UR STRIP: 1.01
UROBILINOGEN UR STRIP-ACNC: <2 MG/DL

## 2025-05-07 PROCEDURE — 99213 OFFICE O/P EST LOW 20 MIN: CPT | Performed by: PHYSICIAN ASSISTANT

## 2025-05-07 PROCEDURE — 87880 STREP A ASSAY W/OPTIC: CPT | Performed by: PHYSICIAN ASSISTANT

## 2025-05-07 PROCEDURE — 81002 URINALYSIS NONAUTO W/O SCOPE: CPT | Performed by: PHYSICIAN ASSISTANT

## 2025-05-07 PROCEDURE — 81025 URINE PREGNANCY TEST: CPT | Performed by: PHYSICIAN ASSISTANT

## 2025-05-07 RX ORDER — CEPHALEXIN 500 MG/1
500 CAPSULE ORAL 3 TIMES DAILY
Qty: 21 CAPSULE | Refills: 0 | Status: SHIPPED | OUTPATIENT
Start: 2025-05-07 | End: 2025-05-14

## 2025-05-07 NOTE — ED PROVIDER NOTES
Patient Seen in: [facility]    History     Chief Complaint   Patient presents with    Sore Throat    Urinary Symptoms     Stated Complaint: Neck, Sore Throat    [facility] [place]    Odette Isbell is a 40 year old female who presents with chief complaint of sore throat.  Onset 3 days ago.  Patient also reports urinary frequency.  Patient states she has recently had recurrent UTIs with group B strep that have been treated with amoxicillin.  Patient denies fever, chills, abdominal pain, nausea, vomiting, diarrhea, constipation, melena, hematochezia, dysuria, hematuria, flank pain, vaginal bleeding, vaginal discharge, earache, nasal drainage, trismus, drooling, rash, neck pain, neck swelling, restricted neck movement, cough, dyspnea.          Past Medical History[1]    Past Surgical History[2]         Family History[3]    Short Social Hx on File[4]    Review of Systems    Positive for stated complaint: Neck, Sore Throat  Other systems are as noted in HPI.  Constitutional and vital signs reviewed.      All other systems reviewed and negative except as noted above.    PSFH elements reviewed from today and agreed except as otherwise stated in HPI.    Physical Exam     ED Triage Vitals [05/07/25 0849]   /73   Pulse 76   Resp 20   Temp 97.8 °F (36.6 °C)   Temp src Oral   SpO2 97 %   O2 Device None (Room air)       Current:/73   Pulse 76   Temp 97.8 °F (36.6 °C) (Oral)   Resp 20   LMP 10/30/2024 (Exact Date)   SpO2 97%     PULSE OX within normal limits on room air as interpreted by this provider.        Physical Exam    Constitutional: The patient is cooperative. Appears well-developed and well-nourished.  Mild discomfort.  Psychological: Alert, No abnormalities of mood, affect.  Head: Normocephalic/atraumatic.  Eyes: Pupils are equal round reactive to light.  Conjunctiva are within normal limits.  ENT: Pharynx injected.  Tonsils within normal size limits bilaterally.  No tonsillar exudates.  Uvula  midline.  No trismus.  No drooling.  Moist mucous membranes.  TMs within normal limits bilaterally.  Neck: The neck is supple.  No meningeal signs.  Chest: There is no tenderness to the chest wall.  No CVA tenderness bilaterally.  Respiratory: Respiratory effort was normal.  There is no stridor.  Air entry is equal.  Cardiovascular: Regular rate and rhythm.  Capillary refill is brisk.    Gastrointestinal: Abdomen soft, nontender, nondistended.  There is no rebound tenderness or guarding.  No organomegaly is noted. No peritoneal signs.  Normal bowel sounds.  Genitourinary: Not examined.  Lymphatic: No gross lymphadenopathy noted.  Musculoskeletal: Musculoskeletal system is grossly intact.  There is no obvious deformity.  Neurological: Gross motor movement is intact in all 4 extremities.  Patient exhibits normal speech.  Skin: Skin is normal to inspection.  Warm and dry.  No obvious bruising.  No obvious rash.          ED Course     Labs Reviewed   [facility] POCT URINALYSIS DIPSTICK - Abnormal; Notable for the following components:       Result Value    Blood, Urine Trace-lysed (*)     Leukocyte esterase urine Small (*)     All other components within normal limits   POCT RAPID STREP - Normal   POCT PREGNANCY URINE - Normal   URINE CULTURE, ROUTINE       MDM     Differential diagnosis including but not limited to UTI, urolithiasis, vaginitis, urethritis, URI, strep pharyngitis, viral pharyngitis    Urine dip demonstrates trace-lysed blood and small leukocyte esterase.  Urine culture pending.  Will treat empirically with Keflex.    Rapid strep negative.    Physical exam remained stable as previously documented.  Available results reviewed with patient.    I have given the patient instructions regarding their diagnoses, expectations, follow up, and ER precautions. I explained to the patient that emergent conditions may arise and to go to the ER for new, worsening or any persistent conditions. I've explained the importance of  following up with their doctor as instructed. The patient verbalized understanding of the discharge instructions and plan.      Disposition and Plan     Clinical Impression:  1. Urinary tract infection with hematuria, site unspecified    2. Acute pharyngitis, unspecified etiology        Disposition:  Discharge    Follow-up:  [facility]  [address]  [place] [address]  [phone]  Call in 1 day  For follow-up    Kimberley Fowler MD  [address] [address]  [phone]    Call in 1 day  For follow-up      Medications Prescribed:  Current Discharge Medication List        START taking these medications    Details   cephALEXin 500 MG Oral Cap Take 1 capsule (500 mg total) by mouth 3 (three) times daily for 7 days.  Qty: 21 capsule, Refills: 0                            [1]   Past Medical History:   DVT (deep vein thrombosis) in pregnancy (HCC)    Migraines   [2]   Past Surgical History:  Procedure Laterality Date    Appendectomy     [3] No family history on file.  [4]   Social History  Socioeconomic History    Marital status:    Tobacco Use    Smoking status: Some Days     Passive exposure: Never    Smokeless tobacco: Never   Vaping Use    Vaping status: Never Used   Substance and Sexual Activity    Alcohol use: Never    Drug use: Never     Social Drivers of Health     Food Insecurity: Not at Risk (2024)    Received from [email or web address]    Food Insecurity     Food: 1   Transportation Needs: Not at Risk (2025)    Received from [email or web address]    Transportation Needs     Transportation: 1   Housing Stability: Not at Risk (2025)    Received from [email or web address]    Housing Stability     [place]ousin

## 2025-05-09 RX ORDER — AMOXICILLIN 875 MG/1
875 TABLET, COATED ORAL 2 TIMES DAILY
Qty: 14 TABLET | Refills: 0 | Status: SHIPPED | OUTPATIENT
Start: 2025-05-09 | End: 2025-05-16

## 2025-07-07 ENCOUNTER — APPOINTMENT (OUTPATIENT)
Dept: GENERAL RADIOLOGY | Age: 40
End: 2025-07-07
Attending: Physician Assistant
Payer: MEDICAID

## 2025-07-07 ENCOUNTER — APPOINTMENT (OUTPATIENT)
Dept: ULTRASOUND IMAGING | Age: 40
End: 2025-07-07
Attending: Physician Assistant
Payer: MEDICAID

## 2025-07-07 ENCOUNTER — HOSPITAL ENCOUNTER (OUTPATIENT)
Age: 40
Discharge: HOME | End: 2025-07-07
Payer: MEDICAID

## 2025-07-07 VITALS
SYSTOLIC BLOOD PRESSURE: 117 MMHG | DIASTOLIC BLOOD PRESSURE: 68 MMHG | HEART RATE: 56 BPM | OXYGEN SATURATION: 100 % | RESPIRATION RATE: 18 BRPM | TEMPERATURE: 97 F

## 2025-07-07 DIAGNOSIS — R10.13 EPIGASTRIC PAIN: Primary | ICD-10-CM

## 2025-07-07 DIAGNOSIS — R30.0 DYSURIA: ICD-10-CM

## 2025-07-07 DIAGNOSIS — M79.89 LEFT LEG SWELLING: ICD-10-CM

## 2025-07-07 LAB
#MXD IC: 0.7 X10ˆ3/UL (ref 0.1–1)
ATRIAL RATE: 62 BPM
B-HCG UR QL: NEGATIVE
BILIRUB UR QL STRIP: NEGATIVE
BUN BLD-MCNC: 9 MG/DL (ref 7–18)
CHLORIDE BLD-SCNC: 103 MMOL/L (ref 98–112)
CLARITY UR: CLEAR
CO2 BLD-SCNC: 30 MMOL/L (ref 21–32)
COLOR UR: YELLOW
CREAT BLD-MCNC: 0.7 MG/DL (ref 0.55–1.02)
EGFRCR SERPLBLD CKD-EPI 2021: 112 ML/MIN/1.73M2 (ref 60–?)
GLUCOSE BLD-MCNC: 88 MG/DL (ref 70–99)
GLUCOSE UR STRIP-MCNC: NEGATIVE MG/DL
HCT VFR BLD AUTO: 38.2 % (ref 35–48)
HCT VFR BLD CALC: 41 % (ref 34–50)
HGB BLD-MCNC: 13.1 G/DL (ref 12–16)
HGB UR QL STRIP: NEGATIVE
ISTAT IONIZED CALCIUM FOR CHEM 8: 1.3 MMOL/L (ref 1.12–1.32)
KETONES UR STRIP-MCNC: NEGATIVE MG/DL
LYMPHOCYTES # BLD AUTO: 2.5 X10ˆ3/UL (ref 1–4)
LYMPHOCYTES NFR BLD AUTO: 33.6 %
MCH RBC QN AUTO: 29.8 PG (ref 26–34)
MCHC RBC AUTO-ENTMCNC: 34.3 G/DL (ref 31–37)
MCV RBC AUTO: 86.8 FL (ref 80–100)
MIXED CELL %: 9 %
NEUTROPHILS # BLD AUTO: 4.3 X10ˆ3/UL (ref 1.5–7.7)
NEUTROPHILS NFR BLD AUTO: 57.4 %
NITRITE UR QL STRIP: NEGATIVE
P AXIS: 49 DEGREES
P-R INTERVAL: 196 MS
PH UR STRIP: 6.5 [PH]
PLATELET # BLD AUTO: 170 X10ˆ3/UL (ref 150–450)
POTASSIUM BLD-SCNC: 4 MMOL/L (ref 3.6–5.1)
PROT UR STRIP-MCNC: 30 MG/DL
Q-T INTERVAL: 420 MS
QRS DURATION: 84 MS
QTC CALCULATION (BEZET): 426 MS
R AXIS: -15 DEGREES
RBC # BLD AUTO: 4.4 X10ˆ6/UL (ref 3.8–5.3)
SODIUM BLD-SCNC: 139 MMOL/L (ref 136–145)
SP GR UR STRIP: >=1.03
T AXIS: -8 DEGREES
TROPONIN I BLD-MCNC: <0.02 NG/ML (ref ?–0.05)
UROBILINOGEN UR STRIP-ACNC: <2 MG/DL
VENTRICULAR RATE: 62 BPM
WBC # BLD AUTO: 7.5 X10ˆ3/UL (ref 4–11)

## 2025-07-07 PROCEDURE — 96374 THER/PROPH/DIAG INJ IV PUSH: CPT | Performed by: PHYSICIAN ASSISTANT

## 2025-07-07 PROCEDURE — 93000 ELECTROCARDIOGRAM COMPLETE: CPT | Performed by: PHYSICIAN ASSISTANT

## 2025-07-07 PROCEDURE — 81025 URINE PREGNANCY TEST: CPT | Performed by: PHYSICIAN ASSISTANT

## 2025-07-07 PROCEDURE — 93971 EXTREMITY STUDY: CPT | Performed by: RADIOLOGY

## 2025-07-07 PROCEDURE — 84484 ASSAY OF TROPONIN QUANT: CPT | Performed by: PHYSICIAN ASSISTANT

## 2025-07-07 PROCEDURE — 96375 TX/PRO/DX INJ NEW DRUG ADDON: CPT | Performed by: PHYSICIAN ASSISTANT

## 2025-07-07 PROCEDURE — 71046 X-RAY EXAM CHEST 2 VIEWS: CPT | Performed by: RADIOLOGY

## 2025-07-07 PROCEDURE — 81002 URINALYSIS NONAUTO W/O SCOPE: CPT | Performed by: PHYSICIAN ASSISTANT

## 2025-07-07 PROCEDURE — 99214 OFFICE O/P EST MOD 30 MIN: CPT | Performed by: PHYSICIAN ASSISTANT

## 2025-07-07 PROCEDURE — 80047 BASIC METABLC PNL IONIZED CA: CPT | Performed by: PHYSICIAN ASSISTANT

## 2025-07-07 PROCEDURE — 85025 COMPLETE CBC W/AUTO DIFF WBC: CPT | Performed by: PHYSICIAN ASSISTANT

## 2025-07-07 RX ORDER — FAMOTIDINE 10 MG/ML
20 INJECTION, SOLUTION INTRAVENOUS ONCE
Status: COMPLETED | OUTPATIENT
Start: 2025-07-07 | End: 2025-07-07

## 2025-07-07 RX ORDER — ONDANSETRON 2 MG/ML
4 INJECTION INTRAMUSCULAR; INTRAVENOUS ONCE
Status: COMPLETED | OUTPATIENT
Start: 2025-07-07 | End: 2025-07-07

## 2025-07-07 RX ORDER — SODIUM CHLORIDE 9 MG/ML
1000 INJECTION, SOLUTION INTRAVENOUS ONCE
Status: COMPLETED | OUTPATIENT
Start: 2025-07-07 | End: 2025-07-07

## 2025-07-07 RX ORDER — MAGNESIUM HYDROXIDE/ALUMINUM HYDROXICE/SIMETHICONE 120; 1200; 1200 MG/30ML; MG/30ML; MG/30ML
30 SUSPENSION ORAL ONCE
Status: COMPLETED | OUTPATIENT
Start: 2025-07-07 | End: 2025-07-07

## 2025-07-07 NOTE — DISCHARGE INSTRUCTIONS
Drink plenty of fluids  Eat a bland diet. Avoid spicy foods, greasy foods, and caffeine   You may benefit from taking an antacid such as Tums or an antihistamine such as Pepcid for your symptoms when they occur     Urine culture results in 1-2 days     Rest and elevate your legs   You may benefit from wearing compression socks to prevent swelling in your legs     Follow up with primary care doctor for further evaluation including checking your liver function and pancreas labs     If you experience severe/worsening pain, vomiting, inability to eat or drink fluids, fever, or any other concerning symptoms, go to nearest ED immediately

## 2025-07-07 NOTE — ED PROVIDER NOTES
Chief Complaint   Patient presents with    Abdominal Pain    Swelling    Urinary Symptoms       History obtained from: patient   services not used    HPI:     Odette Isbell is a 40 year old female who presents multiple complaints. Patient complains of epigastric pain x 4 days.  Patient describes as pain and burning localized to epigastric region without radiation that is worse after eating.  Patient denies association with certain foods/drink.  Denies nausea, vomiting, diarrhea, constipation, blood in stool, chest pain, shortness of breath. Denies drug/alcohol use or smoking.     Patient also complains of ongoing dysuria \"for months.\" Patient states she has seen her doctor for this and has a history of recurrent UTIs. Patient states she would like to make sure she doesn't have a UTI today. Denies new/worsening dysuria, hematuria, flank pain, fevers, chills, vaginal bleeding or discharge.     Patient also complains of swelling to left lower leg that she just noticed today. Patient states she has a history of sciatica and regularly has pain radiating down posterior left leg but states she has never noticed swelling before. Denies injury/trauma, falls, wound, numbness, weakness, change in skin color/temperature. Denies hx of blood clots or recent immobilization.     PMH  Past Medical History[1]    [facility] asessment screens reviewed and agree.  Nurses notes reviewed I agree with documentation.    Family History[2]  Family history reviewed with patient/caregiver and is not pertinent to presenting problem.  Social History     Socioeconomic History    Marital status:      Spouse name: Not on file    Number of children: Not on file    Years of education: Not on file    Highest education level: Not on file   Occupational History    Not on file   Tobacco Use    Smoking status: Some Days     Passive exposure: Never    Smokeless tobacco: Never   Vaping Use    Vaping status: Never Used   Substance and  Sexual Activity    Alcohol use: Never    Drug use: Never    Sexual activity: Not on file   Other Topics Concern    Not on file   Social History Narrative    Not on file     Social Drivers of Health     Food Insecurity: Not at Risk (11/11/2024)    Received from [facility]    Food Insecurity     Within the past 12 months, you worried that your food would run out before you got money to buy more.: 1   Transportation Needs: Not at Risk (1/22/2025)    Received from [facility]    Transportation Needs     In the past 12 months, has lack of transportation kept you from medical appointments, meetings, work or from getting things needed for daily living?: 1   Housing Stability: Not at Risk (1/22/2025)    Received from [facility]    Housing Stability     What is your living situation today? : 1         ROS:   Positive for stated complaint: epigastric pain, dysuria, left lower leg swelling   Other systems are as noted in HPI.   All other systems reviewed and negative except as noted above.    Physical Exam:   Vital signs and nursing note reviewed.       /68   Pulse 56   Temp 97.3 °F (36.3 °C) (Oral)   Resp 18   LMP 10/30/2024 (Exact Date)   SpO2 100%     GENERAL: well developed, no acute distress, non-toxic appearing   SKIN: good skin turgor, no obvious rashes  HEAD: normocephalic, atraumatic  EYES: sclera non-icteric bilaterally, conjunctiva clear bilaterally  OROPHARYNX: MMM, pharynx clear, no exudates or swelling, uvula midline, no tongue elevation, maintaining airway and secretions  NECK: supple, no lymphadenopathy, no nuchal rigidity, no trismus, no edema, phonation normal    CARDIO: RRR, normal heart sounds   LUNGS: clear to auscultation bilaterally, no increased WOB, no rales, rhonchi, or wheezes  GI: normoactive bowel sounds, abdomen soft, minimal epigastric tenderness, no rebound tenderness or guarding, negative Harrison's sign, no CVA tenderness   EXTREMITIES: JUDD without difficulty, no tenderness or obvious swelling to  BLE, no left ankle tenderness, FROM in LLE, compartments soft, CMS intact, skin intact without overlying changes   NEURO: no focal deficits  PSYCH: alert and oriented x3, answering questions appropriately, mood appropriate    MDM/Assessment/Plan:   Orders for this encounter:    Orders Placed This Encounter    US VENOUS DOPPLER LEG LEFT - DIAG IMG (CPT=93971)     What is the Relevant Clinical Indication / Reason for Exam?:   atraumatic left leg pain and swelling x 1 day     Release to patient:   Immediate    XR CHEST PA + LAT CHEST (CPT=71046)     What is the Relevant Clinical Indication / Reason for Exam?:   epigastric pain     Release to patient:   Immediate    POCT CBC     Release to patient:   Immediate    EKG 12 Lead     Release to patient:   Immediate    POCT Urinalysis Dipstick    POCT Pregnancy, Urine    POCT ISTAT chem8 cartridge    ISTAT Troponin    Urine Culture, Routine     Specimen source::   Urine, clean catch     Documentation of symptoms of UTI or sepsis::   Documentation of symptoms of UTI or sepsis must be corroborated within the EMR     Release to patient:   Immediate    POCT Urine Dip    POCT Pregnancy, Urine    Insert Peripheral IV    iStat (Chem 8)    iStat (Troponin)    ondansetron (Zofran) 4 MG/2ML injection 4 mg    famotidine (Pepcid) 20 mg/2mL injection 20 mg    alum-mag hydroxide-simethicone (Maalox) 200-200-20 MG/5ML oral suspension 30 mL    sodium chloride 0.9% infusion 1,000 mL       Labs performed this visit:  Recent Results (from the past 10 hours)   POCT Urinalysis Dipstick    Collection Time: 07/07/25  2:18 PM   Result Value Ref Range    Urine Color Yellow Yellow    Urine Clarity Clear Clear    Specific Gravity, Urine >=1.030 1.005 - 1.030    PH, Urine 6.5 5.0 - 8.0    Protein urine 30 (A) Negative mg/dL    Glucose, Urine Negative Negative mg/dL    Ketone, Urine Negative Negative mg/dL    Bilirubin, Urine Negative Negative    Blood, Urine Negative Negative    Nitrite Urine Negative  Negative    Urobilinogen urine <2.0 <2.0 mg/dL    Leukocyte esterase urine Trace (A) Negative   POCT Pregnancy, Urine    Collection Time: 07/07/25  2:29 PM   Result Value Ref Range    POCT Urine Pregnancy Negative Negative   POCT CBC    Collection Time: 07/07/25  2:53 PM   Result Value Ref Range    WBC IC 7.5 4.0 - 11.0 x10ˆ3/uL    RBC IC 4.40 3.80 - 5.30 X10ˆ6/uL    HGB IC 13.1 12.0 - 16.0 g/dL    HCT IC 38.2 35.0 - 48.0 %    MCV IC 86.8 80.0 - 100.0 fL    MCH IC 29.8 26.0 - 34.0 pg    MCHC IC 34.3 31.0 - 37.0 g/dL    PLT .0 150.0 - 450.0 X10ˆ3/uL    # Neutrophil 4.3 1.5 - 7.7 X10ˆ3/uL    # Lymphocyte 2.5 1.0 - 4.0 X10ˆ3/uL    # Mixed Cells 0.7 0.1 - 1.0 X10ˆ3/uL    Neutrophil % 57.4 %    Lymphocyte % 33.6 %    Mixed Cell % 9.0 %   POCT ISTAT chem8 cartridge    Collection Time: 07/07/25  3:06 PM   Result Value Ref Range    ISTAT Sodium 139 136 - 145 mmol/L    ISTAT BUN 9 7 - 18 mg/dL    ISTAT Potassium 4.0 3.6 - 5.1 mmol/L    ISTAT Chloride 103 98 - 112 mmol/L    ISTAT Ionized Calcium 1.30 1.12 - 1.32 mmol/L    ISTAT Hematocrit 41 34 - 50 %    ISTAT Glucose 88 70 - 99 mg/dL    ISTAT TCO2 30 21 - 32 mmol/L    ISTAT Creatinine 0.70 0.55 - 1.02 mg/dL    eGFR-Cr 112 >=60 mL/min/1.73m2   EKG 12 Lead    Collection Time: 07/07/25  3:13 PM   Result Value Ref Range    Ventricular rate 62 BPM    Atrial rate 62 BPM    P-R Interval 196 ms    QRS Duration 84 ms    Q-T Interval 420 ms    QTC Calculation (Bezet) 426 ms    P Axis 49 degrees    R Axis -15 degrees    T Axis -8 degrees   ISTAT Troponin    Collection Time: 07/07/25  3:24 PM   Result Value Ref Range    ISTAT Troponin <0.02 <0.045 ng/mL ng/mL       Imaging performed this visit:  XR CHEST PA + LAT CHEST (CPT=71046)   Final Result   PROCEDURE: XR CHEST PA + LAT CHEST (CPT=71046)      INDICATIONS: epigastric pain                TECHNIQUE: PA and Lateral chest radiographs were obtained.      FINDINGS:      LUNGS: Normal. No effusion or pleural thickening. No focal  consolidation.     Normal vascularity.       PLEURA: Normal. No pleural effusions. No significant pulmonary parenchymal    abnormalities.       CARDIAC/VASC: Normal. No cardiac silhouette abnormality or cardiomegaly.    Normal size cardiac silhouette. Unremarkable pulmonary vasculature.       MEDIASTINUM/LILY: Normal. No visible mass or adenopathy.       BONES: There are minimal degenerative changes within the thoracic spine.            =====   CONCLUSION: No acute cardiopulmonary process.         Electronically Verified and Signed by Attending Radiologist: Jonah Fuentes MD 7/7/2025 3:54 PM   Workstation: [email or web address]      US VENOUS DOPPLER LEG LEFT - DIAG IMG (CPT=93971)   Final Result   PROCEDURE: US VENOUS DOPPLER LEG LEFT - DIAG IMG (CPT=93971)      INDICATIONS: atraumatic left leg pain and swelling x 1 day               TECHNIQUE: Color duplex Doppler venous ultrasound of the left lower    extremity was performed in the usual manner.         FINDINGS: The femoral and popliteal veins appear normal. Normal flow was    demonstrated with color and pulsed Doppler. Visualized portions of the    great and small saphenous, posterior tibial, and peroneal veins appear    normal.      LEFT LOWER EXTREMITY   THROMBI: None visible.   COMPRESSIBILITY: Normal.   OTHER:Negative.      =====   CONCLUSION: No sonographic evidence of left lower extremity DVT.       Electronically Verified and Signed by Attending Radiologist: Jonah Fuentes MD 7/7/2025 3:44 PM   Workstation: [email or web address]          Medical Decision Making  Patient is overall very well-appearing with stable vitals and tolerating oral intake.     DDx for epigastric pain includes gastritis versus gastroesophageal reflux versus PUD versus pancreatitis versus cholecystitis versus cholelithiasis versus other. Patient complaining of epigastric pain worse after eating/drinking ongoing x 4 days. Mild epigastric tenderness on exam. No RUQ tenderness. Negative Harrison's  sign. Discussed limitations of IC setting in evaluating epigastric pain with patient, including inability to check LFTs and lipase to evaluate for gallbladder and pancreas pathology. Recommend patient be seen in ER for further evaluation however patient refuses. The risks of refusing further evaluation were explained to the patient at length. The patient is clinically not intoxicated, free from distracting pain, appears to have intact insight, judgment, and reason to make decisions. The patient acknowledged understanding this, accepts the risks, and refuses to go to ER. Offered to check basic labs and treat symptomatically which patient would like at this time.  CBC reviewed, grossly unremarkable without evidence of infection or anemia.  BMP reviewed, grossly unremarkable without evidence of electrolyte derangements or kidney dysfunction.  Given epigastric pain, EKG and troponin were also checked. EKG reviewed, normal sinus rhythm, rate 62, flattened T wave in V2 and slight T wave inversion in V3.  No previous EKGs for comparison.  Troponin within normal limits.  Recommend patient follow up with PCP regarding these EKG findings.  Chest x-ray reviewed, no evidence of acute cardiopulmonary process.  Patient given IV fluids, Zofran, Pepcid, and Maalox in IC.  On reassessment, patient resting comfortably and states her epigastric pain has improved.  No new or worsening symptoms throughout IC stay.  Discussed case with Svetlana from patient's PCP office ([facility]) who states patient can been within 1-2 days and have outpatient LFTs and lipase performed. Patient states she feels comfortable with this plan. Discussed supportive care for epigastric pain including increased fluid intake and dietary modifications.     DDx for dysuria includes UTI versus interstitial cystitis versus overactive bladder syndrome versus nephrolithiasis versus other.  No signs or symptoms of systemic illness.  No signs or symptoms of pyelonephritis.   Given patient's overall comfortable appearance and no flank pain, low suspicion for acute nephrolithiasis.  Urine pregnancy test negative.  Urine dipstick analysis reveals protein and trace leukocyte esterase, otherwise grossly unremarkable. Urine culture pending.  Shared decision making employed with patient to defer empiric treatment pending urine culture results.      DDx for left lower leg pain and swelling includes DVT sprain versus contusion versus venous insufficiency versus other.  No obvious swelling on exam.  No wounds or changes in skin color/temperature.  Offered ultrasound to rule out DVT which patient would like at this time.  Ultrasound negative for DVT.  Discussed supportive care including rest, ice, elevation, and compression of legs as well as OTC Tylenol as needed for pain.    Instructed patient to go directly to nearest ER with any worsening or concerning symptoms. Follow up with PCP within 1-2 days.     Discussed case with supervising attending Dr. Tavarez who is in agreement with assessment and plan.     Amount and/or Complexity of Data Reviewed  Labs: ordered.  Radiology: ordered and independent interpretation performed.  ECG/medicine tests: ordered and independent interpretation performed.    Risk  OTC drugs.          Diagnosis:    ICD-10-CM    1. Epigastric pain  R10.13       2. Dysuria  R30.0       3. Left leg swelling  M79.89           All results reviewed and discussed with patient/patient's family. Patient/patient's family verbalize excellent understanding of instructions and feels comfortable with plan. All of patient's/patient's family's questions were addressed.   See AVS for detailed discharge instructions for your condition today.    Follow Up with:  [address] [address]  [phone]  Schedule an appointment as soon as possible for a visit         Note: This document was dictated using Dragon medical dictation  software.  Proofreading was performed to the best of my ability, but errors may be present.    Yesenia Diehl PA-C         [1]   Past Medical History:   DVT (deep vein thrombosis) in pregnancy (HCC)    Migraines   [2] No family history on file.

## 2025-07-07 NOTE — ED INITIAL ASSESSMENT (HPI)
Pt has multiple complaints, c/o epigastric pain when eating or drinking x 4 days + urinary sx for months + swelling to LLE that was noticed today

## (undated) NOTE — ED AVS SNAPSHOT
Delfino Robles   MRN: [id number]    Department:  [facility]   Date of Visit:  4/21/2019           Disclosure     Insurance plans vary and the physician(s) referred by the ER may not be covered by your plan.  Please contac CARE PHYSICIAN AT ONCE OR RETURN IMMEDIATELY TO THE EMERGENCY DEPARTMENT. If you have been prescribed any medication(s), please fill your prescription right away and begin taking the medication(s) as directed.   If you believe that any of the medications

## (undated) NOTE — LETTER
Date & Time: 7/7/2025, 4:49 PM  Patient: Odette Isbell  Encounter Provider(s):    Yesenia Diehl PA-C       To Whom It May Concern:    Odette Isbell was seen and treated in our department on 7/7/2025. She can return to work 7/9/2025.    If you have any questions or concerns, please do not hesitate to call.        _____________________________  Physician/APC Signature

## (undated) NOTE — LETTER
Date & Time: 5/7/2025, 9:21 AM  Patient: Odette Isbell  Encounter Provider(s):    Chana Silva PA       To Whom It May Concern:    Odette Isbell was seen and treated in our department on 5/7/2025. She should not return to work until 05/08/25 .    If you have any questions or concerns, please do not hesitate to call.        _____________________________  Physician/APC Signature

## (undated) NOTE — ED AVS SNAPSHOT
Shandra Fernández   MRN: [id number]    Department:  [facility] Emergency Department   Date of Visit:  5/3/2018           Disclosure     Insurance plans vary and the physician(s) referred by the ER may not be covered by your plan.  Please contact CARE PHYSICIAN AT ONCE OR RETURN IMMEDIATELY TO THE EMERGENCY DEPARTMENT. If you have been prescribed any medication(s), please fill your prescription right away and begin taking the medication(s) as directed.   If you believe that any of the medications

## (undated) NOTE — LETTER
Date & Time: 3/17/2025, 2:07 PM  Patient: Odette Isbell  Encounter Provider(s):    Chana Silva PA       To Whom It May Concern:    Odette Isbell was seen and treated in our department on 3/17/2025. She may return to work on 3/18/2025.    If you have any questions or concerns, please do not hesitate to call.        _____________________________  Physician/APC Signature

## (undated) NOTE — LETTER
Date & Time: 3/2/2022, 9:46 PM  Patient: Ochoa Mcdonald  Encounter Provider(s):    BHARTI Gallegos       To Whom It May Concern:    Fabi Hardy was seen and treated in our department on 3/2/2022. She should not return to work until 03/05/2022.     If you have any questions or concerns, please do not hesitate to call.        _____________________________  Physician/APC Signature

## (undated) NOTE — LETTER
Date & Time: 8/8/2024, 9:51 AM  Patient: Odette Isbell  Encounter Provider(s):    Madelyn Fuller APRN       To Whom It May Concern:    Odette Isbell was seen and treated in our department on 8/8/2024. She should be excused from work through 8/11/2024 or until fever free for 24 hours without the use of fever reducing medicine.    If you have any questions or concerns, please do not hesitate to call.        _____________________________  Physician/APC Signature

## (undated) NOTE — LETTER
Date & Time: 4/26/2019, 7:18 AM  Patient: Jayde Debbie    Dear Jayde Lewis,    We are unable to reach you by phone and would like to follow up with you regarding your visit to the Emergency Department.         Please contact the Emergency Depar

## (undated) NOTE — LETTER
Date & Time: 5/3/2018, 6:05 PM  Patient: [facility]  Encounter Provider(s):    Kathy Johns MD       To Whom It May Concern:    Maximino Drake was seen and treated in our department on 5/3/2018.  She can return to work 5/4/18 with no restri

## (undated) NOTE — LETTER
[address] [address]  [phone]          Patient: Bridget Woodall   YOB: 1985   Date of Visit: 4/7/2020       Dear Employer,         April 7, 2020    At [place] [address] it is at all feasible for them to do so. COVID-19 is especially risky for high-risk patients (including, but not limited to, age over 61, immunosuppressed status due to disease or medication, chronic respiratory or heart conditions and diabetes).     · Marvin Bruce